# Patient Record
Sex: MALE | Race: WHITE | Employment: UNEMPLOYED | URBAN - METROPOLITAN AREA
[De-identification: names, ages, dates, MRNs, and addresses within clinical notes are randomized per-mention and may not be internally consistent; named-entity substitution may affect disease eponyms.]

---

## 2024-02-11 ENCOUNTER — HOSPITAL ENCOUNTER (EMERGENCY)
Age: 29
Discharge: HOME | End: 2024-02-12
Attending: EMERGENCY MEDICINE

## 2024-02-11 ENCOUNTER — APPOINTMENT (OUTPATIENT)
Dept: GENERAL RADIOLOGY | Age: 29
End: 2024-02-11

## 2024-02-11 VITALS
SYSTOLIC BLOOD PRESSURE: 133 MMHG | DIASTOLIC BLOOD PRESSURE: 93 MMHG | HEIGHT: 71 IN | BODY MASS INDEX: 25.9 KG/M2 | HEART RATE: 126 BPM | OXYGEN SATURATION: 96 % | RESPIRATION RATE: 16 BRPM | WEIGHT: 185 LBS

## 2024-02-11 DIAGNOSIS — F41.9 CHRONIC ANXIETY: Primary | ICD-10-CM

## 2024-02-11 PROCEDURE — 99283 EMERGENCY DEPT VISIT LOW MDM: CPT

## 2024-02-11 PROCEDURE — 6370000000 HC RX 637 (ALT 250 FOR IP): Performed by: EMERGENCY MEDICINE

## 2024-02-11 PROCEDURE — 71046 X-RAY EXAM CHEST 2 VIEWS: CPT

## 2024-02-11 RX ORDER — QUETIAPINE FUMARATE 100 MG/1
100 TABLET, FILM COATED ORAL ONCE
Status: COMPLETED | OUTPATIENT
Start: 2024-02-11 | End: 2024-02-11

## 2024-02-11 RX ORDER — CLONAZEPAM 1 MG/1
1 TABLET ORAL
Status: COMPLETED | OUTPATIENT
Start: 2024-02-11 | End: 2024-02-11

## 2024-02-11 RX ADMIN — QUETIAPINE FUMARATE 100 MG: 100 TABLET ORAL at 22:58

## 2024-02-11 RX ADMIN — CLONAZEPAM 1 MG: 1 TABLET ORAL at 22:58

## 2024-02-12 NOTE — ED PROVIDER NOTES
CHEST (2 VW)    Narrative    Chest X-ray    INDICATION: Chest pain    COMPARISON:  None    TECHNIQUE: PA and lateral views of the chest were obtained.    FINDINGS: The lungs are clear. There are no infiltrates or effusions.  The heart  size is normal.  The bony thorax is intact.        Impression    No acute findings in the chest          XR CHEST (2 VW)   Final Result   No acute findings in the chest                             Voice dictation software was used during the making of this note.  This software is not perfect and grammatical and other typographical errors may be present.  This note has not been completely proofread for errors.     Aaron Ivy MD  02/12/24 0153

## 2024-02-12 NOTE — ED NOTES
I have reviewed discharge instructions with the patient.  The patient verbalized understanding.    Patient left ED via Discharge Method: ambulatory to Home.    Opportunity for questions and clarification provided.       Patient given 0 scripts.         To continue your aftercare when you leave the hospital, you may receive an automated call from our care team to check in on how you are doing.  This is a free service and part of our promise to provide the best care and service to meet your aftercare needs.” If you have questions, or wish to unsubscribe from this service please call [phone].  Thank you for Choosing our [facility] Emergency Department.        Rod Hart, RN  02/12/24 6157

## 2024-02-12 NOTE — ED TRIAGE NOTES
Patient presents to ER from home for anxiety and hasn't slept in 48 hours from being on a greyhound bus, due to not making it to his destination as of yet, does have a history of anxiety, takes Clonazepam for it, but does not have his medication with him.

## 2024-02-14 ENCOUNTER — HOSPITAL ENCOUNTER (EMERGENCY)
Age: 29
Discharge: HOME | End: 2024-02-14
Attending: EMERGENCY MEDICINE

## 2024-02-14 VITALS
WEIGHT: 160 LBS | HEART RATE: 93 BPM | BODY MASS INDEX: 22.4 KG/M2 | OXYGEN SATURATION: 98 % | RESPIRATION RATE: 18 BRPM | DIASTOLIC BLOOD PRESSURE: 86 MMHG | HEIGHT: 71 IN | TEMPERATURE: 98.6 F | SYSTOLIC BLOOD PRESSURE: 130 MMHG

## 2024-02-14 DIAGNOSIS — F41.9 CHRONIC ANXIETY: Primary | ICD-10-CM

## 2024-02-14 PROCEDURE — 6370000000 HC RX 637 (ALT 250 FOR IP): Performed by: EMERGENCY MEDICINE

## 2024-02-14 PROCEDURE — 99283 EMERGENCY DEPT VISIT LOW MDM: CPT

## 2024-02-14 RX ORDER — CLONAZEPAM 0.5 MG/1
TABLET ORAL
Qty: 6 TABLET | Refills: 0 | Status: SHIPPED | OUTPATIENT
Start: 2024-02-14 | End: 2024-02-15

## 2024-02-14 RX ORDER — CLONAZEPAM 1 MG/1
1 TABLET ORAL
Status: COMPLETED | OUTPATIENT
Start: 2024-02-14 | End: 2024-02-14

## 2024-02-14 RX ADMIN — CLONAZEPAM 1 MG: 1 TABLET ORAL at 19:50

## 2024-02-14 NOTE — ED TRIAGE NOTES
Pt ambulatory to triage. Pt states that he ran out of Klonopin yesterday. Prescription was filled on 2/8. Pt states that he was on the way from [place] to [place] but he became anxious and got off the bus. Pt states that he needs to detox or something to help his anxiety. Denies SI/HI

## 2024-02-15 ENCOUNTER — HOSPITAL ENCOUNTER (EMERGENCY)
Age: 29
Discharge: HOME | End: 2024-02-15

## 2024-02-15 ENCOUNTER — HOSPITAL ENCOUNTER (EMERGENCY)
Age: 29
Discharge: HOME | End: 2024-02-15
Payer: MEDICAID

## 2024-02-15 VITALS
HEART RATE: 105 BPM | OXYGEN SATURATION: 98 % | RESPIRATION RATE: 18 BRPM | DIASTOLIC BLOOD PRESSURE: 86 MMHG | SYSTOLIC BLOOD PRESSURE: 124 MMHG

## 2024-02-15 VITALS
SYSTOLIC BLOOD PRESSURE: 135 MMHG | HEART RATE: 90 BPM | OXYGEN SATURATION: 98 % | WEIGHT: 165 LBS | BODY MASS INDEX: 23.1 KG/M2 | HEIGHT: 71 IN | RESPIRATION RATE: 16 BRPM | DIASTOLIC BLOOD PRESSURE: 91 MMHG

## 2024-02-15 DIAGNOSIS — F41.9 CHRONIC ANXIETY: Primary | ICD-10-CM

## 2024-02-15 DIAGNOSIS — F41.9 ANXIETY: ICD-10-CM

## 2024-02-15 PROCEDURE — 6370000000 HC RX 637 (ALT 250 FOR IP): Performed by: STUDENT IN AN ORGANIZED HEALTH CARE EDUCATION/TRAINING PROGRAM

## 2024-02-15 PROCEDURE — 99283 EMERGENCY DEPT VISIT LOW MDM: CPT

## 2024-02-15 PROCEDURE — 99282 EMERGENCY DEPT VISIT SF MDM: CPT

## 2024-02-15 RX ORDER — HYDROXYZINE PAMOATE 25 MG/1
25 CAPSULE ORAL
Status: DISCONTINUED | OUTPATIENT
Start: 2024-02-15 | End: 2024-02-16 | Stop reason: HOSPADM

## 2024-02-15 RX ORDER — CLONAZEPAM 0.5 MG/1
TABLET ORAL
Qty: 2 TABLET | Refills: 0 | Status: SHIPPED | OUTPATIENT
Start: 2024-02-15 | End: 2024-02-16

## 2024-02-15 RX ORDER — CLONAZEPAM 1 MG/1
1 TABLET ORAL
Status: COMPLETED | OUTPATIENT
Start: 2024-02-15 | End: 2024-02-15

## 2024-02-15 RX ORDER — CLONAZEPAM 1 MG/1
0.5 TABLET ORAL
Status: DISCONTINUED | OUTPATIENT
Start: 2024-02-15 | End: 2024-02-16 | Stop reason: HOSPADM

## 2024-02-15 RX ADMIN — CLONAZEPAM 1 MG: 1 TABLET ORAL at 14:31

## 2024-02-15 NOTE — ED PROVIDER NOTES
Emergency Department Provider Note       PCP: No primary care provider on file.   Age: 28 y.o.   Sex: male     DISPOSITION Discharge - Pending Orders Complete 02/14/2024 07:32:46 PM       ICD-10-CM    1. Chronic anxiety  F41.9 clonazePAM (KLONOPIN) 0.5 MG tablet          Medical Decision Making     Complexity of Problems Addressed:       Data Reviewed and Analyzed:  I independently ordered and reviewed each unique test.             Discussion of management or test interpretation.  Patient seen here on the 11th and given a dose of Klonopin.  He has no funds or resources.  Patient has chronic anxiety.  No sign of DTs at this time.  Without finding there are no good options for him to detox.  Patient's ultimate goal is to get to [place].  He has no physicians or therapist in this area            Risk of Complications and/or Morbidity of Patient Management:  Prescription drug management performed.        History       Patient en route to [place] from [facility] and has run out of his clonazepam.  Patient now requesting detox from his clonazepam.  He is also on Suboxone and Vyvanse.           Review of Systems   Constitutional:  Negative for fever.   Respiratory:  Negative for cough.    Gastrointestinal:  Negative for abdominal pain, nausea and vomiting.   Psychiatric/Behavioral:  Negative for dysphoric mood and hallucinations. The patient is nervous/anxious.        Physical Exam     Vitals signs and nursing note reviewed:  Vitals:    02/14/24 1846 02/14/24 1857 02/14/24 1858   BP: 137/67 130/86    Pulse: 93     Resp: 18     Temp: 98.6 °F (37 °C)     SpO2: 98%  98%   Weight: 72.6 kg (160 lb)     Height: 1.803 m (5' 11\")        Physical Exam  Vitals and nursing note reviewed.   Constitutional:       General: He is not in acute distress.     Appearance: Normal appearance. He is not ill-appearing.   HENT:      Mouth/Throat:      Mouth: Mucous membranes are moist.   Eyes:      Pupils: Pupils are equal,

## 2024-02-15 NOTE — ED NOTES
I have reviewed discharge instructions with the patient.  The patient verbalized understanding.    Patient left ED via Discharge Method: ambulatory to Home with uber.    Opportunity for questions and clarification provided.       Patient given 1 scripts.         To continue your aftercare when you leave the hospital, you may receive an automated call from our care team to check in on how you are doing.  This is a free service and part of our promise to provide the best care and service to meet your aftercare needs.” If you have questions, or wish to unsubscribe from this service please call [phone].  Thank you for Choosing our [facility] Emergency Department.        Francoise Allen, RN  02/14/24 2008

## 2024-02-15 NOTE — ED PROVIDER NOTES
this patient. [NR]      ED Course User Index  [NR] Jimmy Fry PA      ED attending physician present in department at time of care. Based on current hospital policy, their co-signature is not required on this note.       Risk of Complications and/or Morbidity of Patient Management:  Prescription drug management performed.  Patient was discharged risks and benefits of hospitalization were considered.  Chronic medical problems impacting care include anxiety.        History       28-year-old male patient with history of anxiety presents today requesting Klonopin prescription.  Reports he was here yesterday and was prescribed Klonopin but lost the prescription.  He states he has been on it for \"a long time.\"  He states he stuck in [place] and is trying to get back to [place].  Despite what the triage note says, he states he has no organic complaints today.  He specifically denies chest pain, shortness of breath, abdominal pain, fevers, vomiting.  He states he does not want his temperature taken today.  He denies suicidal or homicidal ideations.  Patient is primary historian.    The history is provided by the patient. No  was used.        Review of Systems   Constitutional:  Negative for fever.   HENT:  Negative for facial swelling and trouble swallowing.    Eyes:  Negative for photophobia and visual disturbance.   Respiratory:  Negative for cough and shortness of breath.    Cardiovascular:  Negative for chest pain.   Gastrointestinal:  Negative for abdominal pain and vomiting.   Genitourinary:  Negative for dysuria.   Musculoskeletal:  Negative for myalgias and neck stiffness.   Skin:  Negative for rash and wound.   Neurological:  Negative for dizziness, syncope, weakness and light-headedness.   Psychiatric/Behavioral:  Negative for self-injury and suicidal ideas. The patient is nervous/anxious.    All other systems reviewed and are negative.      Physical Exam     Vitals signs and nursing

## 2024-02-15 NOTE — DISCHARGE INSTRUCTIONS
A prescription for your anxiety medicine has been sent to your pharmacy on [address]  Follow-up with the psychiatrist at the referral that was given to you yesterday.  You may go to the detox center as well.  Return for new or worsening symptoms.    As we discussed, I did not find a life threatening cause of your symptoms today. However, THAT DOES NOT MEAN IT COULD NOT DEVELOP. If you develop ANY new or worsening symptoms, it is critical that you return for re-evaluation. This includes any symptoms that are concerning to you, especially symptoms such as difficulty breathing, chest pain, fevers.  If you do not return for re-evaluation, you risk serious complications, including death.

## 2024-02-15 NOTE — ED TRIAGE NOTES
Patient arrives to ED complaining of anxiety, chest pain, and losing his prescription. Patient refused staff to take his temperature in triage.

## 2024-02-16 NOTE — ED TRIAGE NOTES
Pt presents to triage complaining of ongoing anxiety.  Patient states that he normally takes clonazepam to help with this but that he is out of his medication.  He states that the last time he took the medication was about 11 AM today.  He endorses some abdominal pain and chest discomfort.  Denies SI or HI while in triage.

## 2024-02-16 NOTE — ED PROVIDER NOTES
Emergency Department Provider Note       PCP: Unknown, Provider, DO   Age: 28 y.o.   Sex: male     DISPOSITION Decision To Discharge 02/15/2024 10:38:20 PM       ICD-10-CM    1. Chronic anxiety  F41.9           Medical Decision Making     Complexity of Problems Addressed:  Complexity of Problem: 1 acute, uncomplicated illness or injury.    Data Reviewed and Analyzed:  I independently ordered and reviewed each unique test.  I reviewed external records: ED visit note from an outside group.       Discussion of management or test interpretation.  28 male presenting to the emergency department with complaint for anxiety today.  Seen earlier today for same complaint.  He has no SI or HI.  He denies any chest pain, abdominal pain or shortness of breath.  He was already given a prescription earlier today that was sent to the pharmacy which patient has not filled.  I will not be prescribing any additional benzodiazepines for the patient.  I am concerned for drug-seeking behavior.  He has no signs of withdrawal.  He was offered a dose of his Klonopin here today as the pharmacy is currently closed however patient declined this as it was not at his preferred dose.  He requested something else for anxiety and also refused vistaril. He was again given referral to [facility] and [facility] for outpatient rehab. I do not feel he require inpatient admission at this time.  ED Course as of 02/16/24 0012   Thu Feb 15, 2024   2130 Patient coming in today with complaints of anxiety and being out of his Klonopin.  Patient has been seen on 3 different occasions in the last 2 days at [facility].  He was seen just earlier this morning at [facility] downtown.  He was given a 2-day prescription for Klonopin that was sent to the [facility] on [address]  Patient states that they pharmacy would not fill the prescription for him as it was \"too early\".  I tried to review PDMP but there is no prescription filling

## 2024-02-16 NOTE — ED NOTES
I have reviewed discharge instructions with the patient.  The patient verbalized understanding.    Patient left ED via Discharge Method: ambulatory to Home with self.     Opportunity for questions and clarification provided.       Patient given 0 scripts.         To continue your aftercare when you leave the hospital, you may receive an automated call from our care team to check in on how you are doing.  This is a free service and part of our promise to provide the best care and service to meet your aftercare needs.” If you have questions, or wish to unsubscribe from this service please call [phone].  Thank you for Choosing our [facility] Emergency Department.

## 2024-02-16 NOTE — ED NOTES
This RN attempted to take temp for vitals, pt refused and advised that he refused having his temp taken upon triage as well.

## 2024-02-16 NOTE — DISCHARGE INSTRUCTIONS
As discussed you need to follow-up at the [facility] and [facility] as you have previously been referred.  Call these phone numbers tomorrow to get scheduled for follow-up.    You refused the Klonopin that was ordered for you here today in the Emergency Department. You were sent a prescription for your Klonopin to the [facility] at [address] [place] [address] in [place] earlier today which you can get filled tomorrow.     Return to the ER for any worsening of symptoms.

## 2024-02-16 NOTE — ED NOTES
Pt given discharge papers at this time. This RN and Charge RN educated pt and provided resources for outpatient care at this time.